# Patient Record
Sex: FEMALE | Race: WHITE
[De-identification: names, ages, dates, MRNs, and addresses within clinical notes are randomized per-mention and may not be internally consistent; named-entity substitution may affect disease eponyms.]

---

## 2020-06-09 ENCOUNTER — HOSPITAL ENCOUNTER (OUTPATIENT)
Dept: HOSPITAL 90 - EDH | Age: 65
Setting detail: OBSERVATION
LOS: 2 days | Discharge: HOME | End: 2020-06-11
Attending: INTERNAL MEDICINE | Admitting: INTERNAL MEDICINE
Payer: COMMERCIAL

## 2020-06-09 VITALS — HEIGHT: 72 IN | BODY MASS INDEX: 21.37 KG/M2 | WEIGHT: 157.8 LBS

## 2020-06-09 VITALS — SYSTOLIC BLOOD PRESSURE: 144 MMHG | DIASTOLIC BLOOD PRESSURE: 69 MMHG

## 2020-06-09 VITALS — DIASTOLIC BLOOD PRESSURE: 84 MMHG | SYSTOLIC BLOOD PRESSURE: 157 MMHG

## 2020-06-09 DIAGNOSIS — Z79.82: ICD-10-CM

## 2020-06-09 DIAGNOSIS — Z85.41: ICD-10-CM

## 2020-06-09 DIAGNOSIS — I73.9: ICD-10-CM

## 2020-06-09 DIAGNOSIS — E44.0: ICD-10-CM

## 2020-06-09 DIAGNOSIS — R42: ICD-10-CM

## 2020-06-09 DIAGNOSIS — I63.9: Primary | ICD-10-CM

## 2020-06-09 DIAGNOSIS — M19.90: ICD-10-CM

## 2020-06-09 DIAGNOSIS — Z72.0: ICD-10-CM

## 2020-06-09 DIAGNOSIS — Z90.710: ICD-10-CM

## 2020-06-09 DIAGNOSIS — E78.5: ICD-10-CM

## 2020-06-09 DIAGNOSIS — J42: ICD-10-CM

## 2020-06-09 DIAGNOSIS — N39.0: ICD-10-CM

## 2020-06-09 LAB
ALBUMIN SERPL-MCNC: 2.7 G/DL (ref 3.5–5)
ALT SERPL-CCNC: 17 U/L (ref 12–78)
AMPHETAMINES UR QL SCN: NEGATIVE
APTT PPP: 28 SEC (ref 26.3–35.5)
AST SERPL-CCNC: 15 U/L (ref 10–37)
BARBITURATES UR QL SCN: NEGATIVE
BASOPHILS NFR BLD AUTO: 1.2 % (ref 0–5)
BENZODIAZ UR QL SCN: NEGATIVE
BILIRUB SERPL-MCNC: 0.3 MG/DL (ref 0.2–1)
BUN SERPL-MCNC: 22 MG/DL (ref 7–18)
BZE UR QL SCN: NEGATIVE
CANNABINOIDS UR QL SCN: NEGATIVE
CHLORIDE SERPL-SCNC: 109 MMOL/L (ref 101–111)
CK SERPL-CCNC: 32 U/L (ref 21–232)
CO2 SERPL-SCNC: 24 MMOL/L (ref 21–32)
CREAT SERPL-MCNC: 1.3 MG/DL (ref 0.5–1.5)
DEPRECATED SQUAMOUS URNS QL MICRO: (no result) /HPF (ref 0–2)
EOSINOPHIL NFR BLD AUTO: 3.5 % (ref 0–8)
ERYTHROCYTE [DISTWIDTH] IN BLOOD BY AUTOMATED COUNT: 13 % (ref 11–15.5)
GFR SERPL CREATININE-BSD FRML MDRD: 44 ML/MIN (ref 60–?)
GLUCOSE SERPL-MCNC: 101 MG/DL (ref 70–105)
HCT VFR BLD AUTO: 41.3 % (ref 36–48)
INR PPP: 0.92 (ref 0.85–1.15)
LDLC SERPL CALC-MCNC: 224 MG/DL (ref 0–99)
LYMPHOCYTES NFR SPEC AUTO: 38.6 % (ref 21–51)
MCH RBC QN AUTO: 29.1 PG (ref 27–33)
MCHC RBC AUTO-ENTMCNC: 33.7 G/DL (ref 32–36)
MCV RBC AUTO: 86.6 FL (ref 79–99)
MONOCYTES NFR BLD AUTO: 10.4 % (ref 3–13)
NEUTROPHILS NFR BLD AUTO: 46.2 % (ref 40–77)
NRBC BLD MANUAL-RTO: 0 % (ref 0–0.19)
OPIATES UR QL SCN: NEGATIVE
PCP UR QL SCN: NEGATIVE
PH UR STRIP: 6.5 [PH] (ref 5–8)
PLATELET # BLD AUTO: 353 K/UL (ref 130–400)
POTASSIUM SERPL-SCNC: 4 MMOL/L (ref 3.5–5.1)
PROT SERPL-MCNC: 6.1 G/DL (ref 6–8.3)
PROT UR QL STRIP: 300 MG/DL
PROTHROMBIN TIME: 10 SEC (ref 9.6–11.6)
RBC # BLD AUTO: 4.77 MIL/UL (ref 4–5.5)
RBC #/AREA URNS HPF: (no result) /HPF (ref 0–1)
SODIUM SERPL-SCNC: 141 MMOL/L (ref 136–145)
SP GR UR STRIP: 1.03 (ref 1–1.03)
UROBILINOGEN UR STRIP-MCNC: 0.2 MG/DL (ref 0.2–1)
WBC # BLD AUTO: 8.6 K/UL (ref 4.8–10.8)

## 2020-06-09 PROCEDURE — 93356 MYOCRD STRAIN IMG SPCKL TRCK: CPT

## 2020-06-09 PROCEDURE — 96372 THER/PROPH/DIAG INJ SC/IM: CPT

## 2020-06-09 PROCEDURE — 93005 ELECTROCARDIOGRAM TRACING: CPT

## 2020-06-09 PROCEDURE — 85025 COMPLETE CBC W/AUTO DIFF WBC: CPT

## 2020-06-09 PROCEDURE — 84484 ASSAY OF TROPONIN QUANT: CPT

## 2020-06-09 PROCEDURE — 83036 HEMOGLOBIN GLYCOSYLATED A1C: CPT

## 2020-06-09 PROCEDURE — 80305 DRUG TEST PRSMV DIR OPT OBS: CPT

## 2020-06-09 PROCEDURE — 87077 CULTURE AEROBIC IDENTIFY: CPT

## 2020-06-09 PROCEDURE — 82948 REAGENT STRIP/BLOOD GLUCOSE: CPT

## 2020-06-09 PROCEDURE — 85610 PROTHROMBIN TIME: CPT

## 2020-06-09 PROCEDURE — 81001 URINALYSIS AUTO W/SCOPE: CPT

## 2020-06-09 PROCEDURE — 97039 UNLISTED MODALITY: CPT

## 2020-06-09 PROCEDURE — 85651 RBC SED RATE NONAUTOMATED: CPT

## 2020-06-09 PROCEDURE — 85730 THROMBOPLASTIN TIME PARTIAL: CPT

## 2020-06-09 PROCEDURE — 70496 CT ANGIOGRAPHY HEAD: CPT

## 2020-06-09 PROCEDURE — 36415 COLL VENOUS BLD VENIPUNCTURE: CPT

## 2020-06-09 PROCEDURE — 80053 COMPREHEN METABOLIC PANEL: CPT

## 2020-06-09 PROCEDURE — 70450 CT HEAD/BRAIN W/O DYE: CPT

## 2020-06-09 PROCEDURE — 70553 MRI BRAIN STEM W/O & W/DYE: CPT

## 2020-06-09 PROCEDURE — 70498 CT ANGIOGRAPHY NECK: CPT

## 2020-06-09 PROCEDURE — 96374 THER/PROPH/DIAG INJ IV PUSH: CPT

## 2020-06-09 PROCEDURE — 83721 ASSAY OF BLOOD LIPOPROTEIN: CPT

## 2020-06-09 PROCEDURE — 97161 PT EVAL LOW COMPLEX 20 MIN: CPT

## 2020-06-09 PROCEDURE — 92610 EVALUATE SWALLOWING FUNCTION: CPT

## 2020-06-09 PROCEDURE — 82550 ASSAY OF CK (CPK): CPT

## 2020-06-09 PROCEDURE — 84481 FREE ASSAY (FT-3): CPT

## 2020-06-09 PROCEDURE — 93306 TTE W/DOPPLER COMPLETE: CPT

## 2020-06-09 PROCEDURE — 71045 X-RAY EXAM CHEST 1 VIEW: CPT

## 2020-06-09 PROCEDURE — 99291 CRITICAL CARE FIRST HOUR: CPT

## 2020-06-09 PROCEDURE — 87186 SC STD MICRODIL/AGAR DIL: CPT

## 2020-06-09 PROCEDURE — 80061 LIPID PANEL: CPT

## 2020-06-09 PROCEDURE — 85027 COMPLETE CBC AUTOMATED: CPT

## 2020-06-09 PROCEDURE — 97116 GAIT TRAINING THERAPY: CPT

## 2020-06-09 PROCEDURE — 84439 ASSAY OF FREE THYROXINE: CPT

## 2020-06-09 PROCEDURE — 84443 ASSAY THYROID STIM HORMONE: CPT

## 2020-06-09 PROCEDURE — 80048 BASIC METABOLIC PNL TOTAL CA: CPT

## 2020-06-09 PROCEDURE — 87088 URINE BACTERIA CULTURE: CPT

## 2020-06-09 RX ADMIN — SODIUM CHLORIDE SCH GM: 9 INJECTION, SOLUTION INTRAVENOUS at 17:00

## 2020-06-09 RX ADMIN — SODIUM CHLORIDE SCH MLS/HR: 0.9 INJECTION, SOLUTION INTRAVENOUS at 16:30

## 2020-06-09 NOTE — NUR
ADMISSION

RECEIVED PT FROM ER, A&OX3, WITH DELAYED SPEECH RESPONSE, NO FACIAL DROOP, TONGUE MIDLINE, 
DENIES PAIN. AMBULATING FROM GURNEY TO BED TO BATHROOM AND BACK TO BED, GAIT SLOW AND 
UNSTEADY WITH ASSIST, PT USING HANDS UPON BED AND WALL FOR SUPPORT BUT DENIES DIZZINESS, PT 
IS ABLE TO TOLERATE THIN LIQUIDS WITH NO THROAT CLEARING OR COUGH. PT RESTING COMFORTABLY, 
CALL LIGHT WITHIN REACH, BED ALARM ACTIVATED.

## 2020-06-10 VITALS — SYSTOLIC BLOOD PRESSURE: 158 MMHG | DIASTOLIC BLOOD PRESSURE: 78 MMHG

## 2020-06-10 VITALS — SYSTOLIC BLOOD PRESSURE: 162 MMHG | DIASTOLIC BLOOD PRESSURE: 98 MMHG

## 2020-06-10 VITALS — DIASTOLIC BLOOD PRESSURE: 75 MMHG | SYSTOLIC BLOOD PRESSURE: 134 MMHG

## 2020-06-10 VITALS — DIASTOLIC BLOOD PRESSURE: 70 MMHG | SYSTOLIC BLOOD PRESSURE: 134 MMHG

## 2020-06-10 VITALS — DIASTOLIC BLOOD PRESSURE: 89 MMHG | SYSTOLIC BLOOD PRESSURE: 138 MMHG

## 2020-06-10 LAB
ALBUMIN SERPL-MCNC: 2.2 G/DL (ref 3.5–5)
ALT SERPL-CCNC: 10 U/L (ref 12–78)
AST SERPL-CCNC: 14 U/L (ref 10–37)
BILIRUB SERPL-MCNC: 0.3 MG/DL (ref 0.2–1)
BUN SERPL-MCNC: 17 MG/DL (ref 7–18)
CHLORIDE SERPL-SCNC: 111 MMOL/L (ref 101–111)
CHOLEST SERPL-MCNC: 255 MG/DL (ref ?–200)
CO2 SERPL-SCNC: 25 MMOL/L (ref 21–32)
CREAT SERPL-MCNC: 1.2 MG/DL (ref 0.5–1.5)
ERYTHROCYTE [DISTWIDTH] IN BLOOD BY AUTOMATED COUNT: 13.2 % (ref 11–15.5)
ERYTHROCYTE [SEDIMENTATION RATE] IN BLOOD BY WESTERGREN METHOD: 9 MM/HR (ref 0–30)
GFR SERPL CREATININE-BSD FRML MDRD: 48 ML/MIN (ref 60–?)
GLUCOSE SERPL-MCNC: 92 MG/DL (ref 70–105)
HBA1C MFR BLD: 5.5 % (ref 4–6)
HCT VFR BLD AUTO: 37.2 % (ref 36–48)
HDLC SERPL-MCNC: 120 MG/DL (ref 35–85)
INR PPP: 0.94 (ref 0.85–1.15)
LDLC SERPL CALC-MCNC: 177 MG/DL (ref 0–99)
MCH RBC QN AUTO: 29.3 PG (ref 27–33)
MCHC RBC AUTO-ENTMCNC: 33.3 G/DL (ref 32–36)
MCV RBC AUTO: 87.9 FL (ref 79–99)
NRBC BLD MANUAL-RTO: 0 % (ref 0–0.19)
PLATELET # BLD AUTO: 336 K/UL (ref 130–400)
POTASSIUM SERPL-SCNC: 3.8 MMOL/L (ref 3.5–5.1)
PROT SERPL-MCNC: 5.1 G/DL (ref 6–8.3)
PROTHROMBIN TIME: 10.2 SEC (ref 9.6–11.6)
RBC # BLD AUTO: 4.23 MIL/UL (ref 4–5.5)
SODIUM SERPL-SCNC: 142 MMOL/L (ref 136–145)
TRIGL SERPL-MCNC: 180 MG/DL (ref 30–200)
TSH SERPL DL<=0.05 MIU/L-ACNC: 4.52 UIU/ML (ref 0.36–3.74)
WBC # BLD AUTO: 8 K/UL (ref 4.8–10.8)

## 2020-06-10 RX ADMIN — HEPARIN SODIUM SCH UNIT: 5000 INJECTION, SOLUTION INTRAVENOUS; SUBCUTANEOUS at 08:43

## 2020-06-10 RX ADMIN — PANTOPRAZOLE SODIUM SCH MG: 40 TABLET, DELAYED RELEASE ORAL at 08:35

## 2020-06-10 RX ADMIN — SODIUM CHLORIDE SCH MLS/HR: 0.9 INJECTION, SOLUTION INTRAVENOUS at 20:18

## 2020-06-10 RX ADMIN — SODIUM CHLORIDE SCH MLS/HR: 0.9 INJECTION, SOLUTION INTRAVENOUS at 05:54

## 2020-06-10 RX ADMIN — SODIUM CHLORIDE SCH GM: 9 INJECTION, SOLUTION INTRAVENOUS at 17:16

## 2020-06-10 RX ADMIN — ASPIRIN SCH MG: 81 TABLET, CHEWABLE ORAL at 08:36

## 2020-06-10 NOTE — NUR
DYSPHAGIA SANDIE COMPLETED.   



S/S OF ASPIRATION AT THIS TIME. RECOMMEND REGULAR SOLIDS, THIN LIQUIDS, AND PILLS WHOLE 
WITH LIQUIDS AS TOLERATED. 



SLP EDUCATED Pt ON RISKS AND CONSEQUENCES OF ASPIRATION. ALL QUESTIONS ANSWERED AT THIS 
TIME. SLP COORDINATED WITH NURSE KAT.

-------------------------------------------------------------------------------

Addendum: 06/10/20 at [address] ALEXANDRE CENTENO

-------------------------------------------------------------------------------

Amended: Links added.

## 2020-06-10 NOTE — NUR
DCP

CM met with pt discussed dc plans. Pt is independent prior to admission, lives w/family. 
Denies any equipments/services. Feelss safe to go back home, still drives and works, brother 
Sp able and family to assist with transportation and needs as necessary. DC plan to home 
once stable. CM to cont to follow up.

-------------------------------------------------------------------------------

Addendum: 06/10/20 at 1645 by KATHARINE HUGHES LVN CM

-------------------------------------------------------------------------------

Amended: Links added.

## 2020-06-11 VITALS — DIASTOLIC BLOOD PRESSURE: 54 MMHG | SYSTOLIC BLOOD PRESSURE: 135 MMHG

## 2020-06-11 VITALS — SYSTOLIC BLOOD PRESSURE: 136 MMHG | DIASTOLIC BLOOD PRESSURE: 76 MMHG

## 2020-06-11 VITALS — DIASTOLIC BLOOD PRESSURE: 66 MMHG | SYSTOLIC BLOOD PRESSURE: 120 MMHG

## 2020-06-11 VITALS — SYSTOLIC BLOOD PRESSURE: 137 MMHG | DIASTOLIC BLOOD PRESSURE: 75 MMHG

## 2020-06-11 LAB
BUN SERPL-MCNC: 18 MG/DL (ref 7–18)
CHLORIDE SERPL-SCNC: 111 MMOL/L (ref 101–111)
CO2 SERPL-SCNC: 23 MMOL/L (ref 21–32)
CREAT SERPL-MCNC: 1.3 MG/DL (ref 0.5–1.5)
ERYTHROCYTE [DISTWIDTH] IN BLOOD BY AUTOMATED COUNT: 12.8 % (ref 11–15.5)
GFR SERPL CREATININE-BSD FRML MDRD: 44 ML/MIN (ref 60–?)
GLUCOSE SERPL-MCNC: 95 MG/DL (ref 70–105)
HCT VFR BLD AUTO: 34.3 % (ref 36–48)
MCH RBC QN AUTO: 29.6 PG (ref 27–33)
MCHC RBC AUTO-ENTMCNC: 34.1 G/DL (ref 32–36)
MCV RBC AUTO: 86.8 FL (ref 79–99)
NRBC BLD MANUAL-RTO: 0 % (ref 0–0.19)
PLATELET # BLD AUTO: 323 K/UL (ref 130–400)
POTASSIUM SERPL-SCNC: 3.8 MMOL/L (ref 3.5–5.1)
RBC # BLD AUTO: 3.95 MIL/UL (ref 4–5.5)
SODIUM SERPL-SCNC: 141 MMOL/L (ref 136–145)
WBC # BLD AUTO: 8.3 K/UL (ref 4.8–10.8)

## 2020-06-11 RX ADMIN — SODIUM CHLORIDE SCH MLS/HR: 0.9 INJECTION, SOLUTION INTRAVENOUS at 08:21

## 2020-06-11 RX ADMIN — PANTOPRAZOLE SODIUM SCH MG: 40 TABLET, DELAYED RELEASE ORAL at 08:00

## 2020-06-11 RX ADMIN — HEPARIN SODIUM SCH UNIT: 5000 INJECTION, SOLUTION INTRAVENOUS; SUBCUTANEOUS at 08:05

## 2020-06-11 RX ADMIN — ASPIRIN SCH MG: 81 TABLET, CHEWABLE ORAL at 08:00

## 2020-06-11 NOTE — NUR
DC TO HOME

PT AGREES TO FOLLOW ALL DC INSTRUCTIONS AND ALL DC MED ORDERS. ALL QUESTIONS ANSWERED, PIV 
REMOVED CATH TIP INTACT, TELE PACK REMOVED.

## 2020-06-11 NOTE — NUR
ASSESSMENT

PT IS AAOX3 DENIES CP DENIES SOB DENIES NV NO COMPLAINTS SITTING UP IN BED, AM MEDS GIVEN. 
CALL LIGHT WITHIN REACH.

## 2020-12-05 ENCOUNTER — HOSPITAL ENCOUNTER (EMERGENCY)
Dept: HOSPITAL 90 - EDH | Age: 65
Discharge: ANOTHER HOSPITAL | End: 2020-12-05
Payer: COMMERCIAL

## 2020-12-05 DIAGNOSIS — R03.0: Primary | ICD-10-CM

## 2020-12-05 DIAGNOSIS — M19.90: ICD-10-CM

## 2020-12-05 DIAGNOSIS — E78.00: ICD-10-CM

## 2020-12-05 DIAGNOSIS — Z86.73: ICD-10-CM

## 2020-12-05 DIAGNOSIS — Z72.0: ICD-10-CM

## 2020-12-05 LAB
ALBUMIN SERPL-MCNC: 2.5 G/DL (ref 3.5–5)
ALT SERPL-CCNC: 11 U/L (ref 12–78)
AMPHETAMINES UR QL SCN: NEGATIVE
APTT PPP: 26.9 SEC (ref 26.3–35.5)
AST SERPL-CCNC: 12 U/L (ref 10–37)
BARBITURATES UR QL SCN: NEGATIVE
BASOPHILS NFR BLD AUTO: 1.2 % (ref 0–5)
BENZODIAZ UR QL SCN: NEGATIVE
BILIRUB SERPL-MCNC: 0.5 MG/DL (ref 0.2–1)
BUN SERPL-MCNC: 19 MG/DL (ref 7–18)
BZE UR QL SCN: NEGATIVE
CANNABINOIDS UR QL SCN: NEGATIVE
CHLORIDE SERPL-SCNC: 109 MMOL/L (ref 101–111)
CK SERPL-CCNC: 37 U/L (ref 21–232)
CO2 SERPL-SCNC: 26 MMOL/L (ref 21–32)
CREAT SERPL-MCNC: 1.3 MG/DL (ref 0.5–1.5)
DEPRECATED SQUAMOUS URNS QL MICRO: (no result) /HPF (ref 0–2)
EOSINOPHIL NFR BLD AUTO: 2.9 % (ref 0–8)
ERYTHROCYTE [DISTWIDTH] IN BLOOD BY AUTOMATED COUNT: 12.9 % (ref 11–15.5)
GFR SERPL CREATININE-BSD FRML MDRD: 43 ML/MIN (ref 60–?)
GLUCOSE SERPL-MCNC: 87 MG/DL (ref 70–105)
HCT VFR BLD AUTO: 38.7 % (ref 36–48)
INR PPP: 0.93 (ref 0.85–1.15)
LDLC SERPL CALC-MCNC: 198 MG/DL (ref 0–99)
LYMPHOCYTES NFR SPEC AUTO: 31.4 % (ref 21–51)
MCH RBC QN AUTO: 29.6 PG (ref 27–33)
MCHC RBC AUTO-ENTMCNC: 33.9 G/DL (ref 32–36)
MCV RBC AUTO: 87.6 FL (ref 79–99)
MONOCYTES NFR BLD AUTO: 9.5 % (ref 3–13)
NEUTROPHILS NFR BLD AUTO: 54.8 % (ref 40–77)
NRBC BLD MANUAL-RTO: 0 % (ref 0–0.19)
OPIATES UR QL SCN: NEGATIVE
PCP UR QL SCN: NEGATIVE
PH UR STRIP: 6.5 [PH] (ref 5–8)
PLATELET # BLD AUTO: 377 K/UL (ref 130–400)
POTASSIUM SERPL-SCNC: 4.3 MMOL/L (ref 3.5–5.1)
PROT SERPL-MCNC: 5.9 G/DL (ref 6–8.3)
PROT UR QL STRIP: 300 MG/DL
PROTHROMBIN TIME: 10.1 SEC (ref 9.6–11.6)
RBC # BLD AUTO: 4.42 MIL/UL (ref 4–5.5)
RBC #/AREA URNS HPF: (no result) /HPF (ref 0–1)
SODIUM SERPL-SCNC: 143 MMOL/L (ref 136–145)
SP GR UR STRIP: 1.02 (ref 1–1.03)
UROBILINOGEN UR STRIP-MCNC: 0.2 MG/DL (ref 0.2–1)
WBC # BLD AUTO: 8.9 K/UL (ref 4.8–10.8)

## 2020-12-05 PROCEDURE — 70450 CT HEAD/BRAIN W/O DYE: CPT

## 2020-12-05 PROCEDURE — 80053 COMPREHEN METABOLIC PANEL: CPT

## 2020-12-05 PROCEDURE — 71045 X-RAY EXAM CHEST 1 VIEW: CPT

## 2020-12-05 PROCEDURE — 85025 COMPLETE CBC W/AUTO DIFF WBC: CPT

## 2020-12-05 PROCEDURE — 82948 REAGENT STRIP/BLOOD GLUCOSE: CPT

## 2020-12-05 PROCEDURE — 80305 DRUG TEST PRSMV DIR OPT OBS: CPT

## 2020-12-05 PROCEDURE — 70498 CT ANGIOGRAPHY NECK: CPT

## 2020-12-05 PROCEDURE — 93005 ELECTROCARDIOGRAM TRACING: CPT

## 2020-12-05 PROCEDURE — 87088 URINE BACTERIA CULTURE: CPT

## 2020-12-05 PROCEDURE — 70496 CT ANGIOGRAPHY HEAD: CPT

## 2020-12-05 PROCEDURE — 84484 ASSAY OF TROPONIN QUANT: CPT

## 2020-12-05 PROCEDURE — 99285 EMERGENCY DEPT VISIT HI MDM: CPT

## 2020-12-05 PROCEDURE — 82550 ASSAY OF CK (CPK): CPT

## 2020-12-05 PROCEDURE — 36415 COLL VENOUS BLD VENIPUNCTURE: CPT

## 2020-12-05 PROCEDURE — 81001 URINALYSIS AUTO W/SCOPE: CPT

## 2020-12-05 PROCEDURE — 85610 PROTHROMBIN TIME: CPT

## 2020-12-05 PROCEDURE — 87077 CULTURE AEROBIC IDENTIFY: CPT

## 2020-12-05 PROCEDURE — 83721 ASSAY OF BLOOD LIPOPROTEIN: CPT

## 2020-12-05 PROCEDURE — 87186 SC STD MICRODIL/AGAR DIL: CPT

## 2020-12-05 PROCEDURE — 85730 THROMBOPLASTIN TIME PARTIAL: CPT
